# Patient Record
Sex: MALE | Race: WHITE | NOT HISPANIC OR LATINO | Employment: FULL TIME | URBAN - METROPOLITAN AREA
[De-identification: names, ages, dates, MRNs, and addresses within clinical notes are randomized per-mention and may not be internally consistent; named-entity substitution may affect disease eponyms.]

---

## 2021-04-29 ENCOUNTER — HOSPITAL ENCOUNTER (EMERGENCY)
Facility: HOSPITAL | Age: 26
Discharge: HOME | End: 2021-04-29
Admitting: EMERGENCY MEDICINE
Payer: COMMERCIAL

## 2021-04-29 VITALS
HEART RATE: 67 BPM | OXYGEN SATURATION: 98 % | RESPIRATION RATE: 16 BRPM | DIASTOLIC BLOOD PRESSURE: 86 MMHG | WEIGHT: 142.2 LBS | SYSTOLIC BLOOD PRESSURE: 142 MMHG | TEMPERATURE: 97.7 F

## 2021-04-29 DIAGNOSIS — J06.9 URI (UPPER RESPIRATORY INFECTION): Primary | ICD-10-CM

## 2021-04-29 LAB — SARS-COV-2 RNA RESP QL NAA+PROBE: NEGATIVE

## 2021-04-29 PROCEDURE — U0003 INFECTIOUS AGENT DETECTION BY NUCLEIC ACID (DNA OR RNA); SEVERE ACUTE RESPIRATORY SYNDROME CORONAVIRUS 2 (SARS-COV-2) (CORONAVIRUS DISEASE [COVID-19]), AMPLIFIED PROBE TECHNIQUE, MAKING USE OF HIGH THROUGHPUT TECHNOLOGIES AS DESCRIBED BY CMS-2020-01-R: HCPCS | Performed by: PHYSICIAN ASSISTANT

## 2021-04-29 PROCEDURE — U0005 INFEC AGEN DETEC AMPLI PROBE: HCPCS | Performed by: PHYSICIAN ASSISTANT

## 2021-04-29 PROCEDURE — 99283 EMERGENCY DEPT VISIT LOW MDM: CPT

## 2021-04-29 PROCEDURE — 99282 EMERGENCY DEPT VISIT SF MDM: CPT | Performed by: PHYSICIAN ASSISTANT

## 2021-04-29 NOTE — ED PROVIDER NOTES
History  Chief Complaint   Patient presents with    Sore Throat     pt reports sore throat and congestion x 2 days  pt denies fevers, denies n/v/d  Neli Cheng is a 31yo who presents to the ED for covid testing for work  Had a sore throat 2 days ago that has resolved  Now has a mild congestion and mild dry cough  No sick contacts  No known exposure  Just wants to get tested  Denies fevers, chills, sob, chest pain, abd pain, n/v/d, and rash; no sig  PMH/PSH  No other acute complaints  None       No past medical history on file  No past surgical history on file  No family history on file  I have reviewed and agree with the history as documented  E-Cigarette/Vaping     E-Cigarette/Vaping Substances     Social History     Tobacco Use    Smoking status: Never Smoker    Smokeless tobacco: Never Used   Substance Use Topics    Alcohol use: Never     Frequency: Never    Drug use: Never       Review of Systems   Constitutional: Negative for activity change, appetite change, chills, fatigue and fever  HENT: Positive for congestion  Negative for rhinorrhea and sore throat  Sore throat resolved   Eyes: Negative for visual disturbance  Respiratory: Positive for cough  Negative for shortness of breath  Cardiovascular: Negative for chest pain  Gastrointestinal: Negative for abdominal pain, diarrhea, nausea and vomiting  Genitourinary: Negative for dysuria and frequency  Skin: Negative for rash  Allergic/Immunologic: Negative for immunocompromised state  Neurological: Negative for dizziness, syncope, weakness and headaches  Psychiatric/Behavioral: Negative for behavioral problems and self-injury  All other systems reviewed and are negative  Physical Exam  Physical Exam  Vitals signs and nursing note reviewed  Constitutional:       General: He is not in acute distress  Appearance: He is well-developed  He is not ill-appearing, toxic-appearing or diaphoretic     HENT: Head: Normocephalic and atraumatic  Eyes:      Conjunctiva/sclera: Conjunctivae normal    Neck:      Musculoskeletal: Normal range of motion and neck supple  Cardiovascular:      Rate and Rhythm: Normal rate and regular rhythm  Pulmonary:      Effort: Pulmonary effort is normal       Breath sounds: Normal breath sounds  Skin:     General: Skin is warm and dry  Neurological:      Mental Status: He is alert and oriented to person, place, and time  Psychiatric:         Behavior: Behavior normal          Vital Signs  ED Triage Vitals [04/29/21 1143]   Temperature Pulse Respirations Blood Pressure SpO2   97 7 °F (36 5 °C) 67 16 142/86 98 %      Temp Source Heart Rate Source Patient Position - Orthostatic VS BP Location FiO2 (%)   Oral Monitor -- -- --      Pain Score       --           Vitals:    04/29/21 1143   BP: 142/86   Pulse: 67         Visual Acuity      ED Medications  Medications - No data to display    Diagnostic Studies  Results Reviewed     Procedure Component Value Units Date/Time    Novel Coronavirus (Covid-19),PCR [facility] - 2 Hour Stat [[id number]]  (Normal) Collected: 04/29/21 1219    Lab Status: Final result Specimen: Nasopharyngeal Swab Updated: 04/29/21 1336     SARS-CoV-2 Negative    Narrative: The specimen collection materials, transport medium, and/or testing methodology utilized in the production of these test results have been proven to be reliable in a limited validation with an abbreviated program under the Emergency Utilization Authorization provided by the FDA  Testing reported as "Presumptive positive" will be confirmed with secondary testing to ensure result accuracy  Clinical caution and judgement should be used with the interpretation of these results with consideration of the clinical impression and other laboratory testing  Testing reported as "Positive" or "Negative" has been proven to be accurate according to standard laboratory validation requirements    All testing is performed with control materials showing appropriate reactivity at standard intervals  No orders to display              Procedures  Procedures         ED Course                                           MDM    Disposition  Final diagnoses:   URI (upper respiratory infection)     Time reflects when diagnosis was documented in both MDM as applicable and the Disposition within this note     Time User Action Codes Description Comment    4/29/2021 12:14 PM Mars Fast Add [J06 9] URI (upper respiratory infection)       ED Disposition     ED Disposition Condition Date/Time Comment    Discharge Stable Thu Apr 29, 2021 12:14 PM Solitario Rodríguez discharge to home/self care  Follow-up Information     Follow up With Specialties Details Why Contact Info Additional [address] Emergency Department Emergency Medicine  If symptoms worsen [address]  [address] Emergency Department, [address], [place], [place], [address] [phone]          There are no discharge medications for this patient  No discharge procedures on file      PDMP Review     None          ED Provider  Electronically Signed by           Jonathan Cardenas PA-C  04/29/21 1477

## 2021-06-25 ENCOUNTER — HOSPITAL ENCOUNTER (EMERGENCY)
Facility: HOSPITAL | Age: 26
Discharge: HOME | End: 2021-06-25
Attending: EMERGENCY MEDICINE
Payer: COMMERCIAL

## 2021-06-25 VITALS
SYSTOLIC BLOOD PRESSURE: 129 MMHG | WEIGHT: 148.81 LBS | BODY MASS INDEX: 23.36 KG/M2 | DIASTOLIC BLOOD PRESSURE: 72 MMHG | TEMPERATURE: 98 F | RESPIRATION RATE: 16 BRPM | HEART RATE: 65 BPM | HEIGHT: 67 IN | OXYGEN SATURATION: 97 %

## 2021-06-25 DIAGNOSIS — F41.9 ANXIETY: Primary | ICD-10-CM

## 2021-06-25 PROCEDURE — 99283 EMERGENCY DEPT VISIT LOW MDM: CPT

## 2021-06-25 PROCEDURE — 99284 EMERGENCY DEPT VISIT MOD MDM: CPT | Performed by: EMERGENCY MEDICINE

## 2021-06-25 NOTE — Clinical Note
Priyank Babcock was seen and treated in our emergency department on 6/25/2021  Diagnosis:     Solitario    He may return on this date: If you have any questions or concerns, please don't hesitate to call        Nicho Posada MD    ______________________________           _______________          _______________  Hospital Representative                              Date                                Time

## 2021-06-26 NOTE — ED PROVIDER NOTES
History  Chief Complaint   Patient presents with    Numbness     pt reports b/l hand numbness and tingling since this afternoon, pt states he also feels like his face is hot, pt states this has happened to him before and notices it happens when he gets stressed out or angry      59-year-old male with history of anxiety in the past presenting for evaluation increased anxiety and bilateral hand numbness and tingling in his fingertips has started this afternoon with increased stress  Patient reports that he thinks he may a panic attacks  Given to the emergency department today because he wants to be evaluated to see if his blood pressure is high or his heart rate is high  He is convinced that this is all due to panic attacks however  He denies any associated chest pain or shortness of breath with it  Denies any nausea or vomiting  Has not taken anything at home for the symptoms  Currently does not have any tingling or numbness in his fingertips  No increased recent life stressors  He reports that he has been using Sabianism recently to deal with his anxiety  He is not specifically interested in any psych resources at this time  Denies any significant depression, HI or SI  Denies any hallucinations or other psychiatric symptoms  Denies any drug use, does drink socially  No other medical complaints right now  History provided by:  Patient   used: No        None       History reviewed  No pertinent past medical history  History reviewed  No pertinent surgical history  History reviewed  No pertinent family history  I have reviewed and agree with the history as documented      E-Cigarette/Vaping     E-Cigarette/Vaping Substances     Social History     Tobacco Use    Smoking status: Never Smoker    Smokeless tobacco: Never Used   Substance Use Topics    Alcohol use: Yes     Comment: socially     Drug use: Never        Review of Systems   Constitutional: Negative for chills and fever    HENT: Negative for congestion and sore throat  Eyes: Negative for pain and visual disturbance  Respiratory: Negative for cough, shortness of breath and wheezing  Cardiovascular: Negative for chest pain and palpitations  Gastrointestinal: Negative for abdominal pain, diarrhea, nausea and vomiting  Genitourinary: Negative for dysuria and hematuria  Musculoskeletal: Negative for arthralgias and back pain  Skin: Negative for color change and rash  Neurological: Negative for seizures and syncope  Psychiatric/Behavioral: Negative for agitation, confusion, dysphoric mood and suicidal ideas  The patient is nervous/anxious  All other systems reviewed and are negative  Physical Exam  ED Triage Vitals [06/25/21 2146]   Temperature Pulse Respirations Blood Pressure SpO2   98 °F (36 7 °C) 65 16 129/72 97 %      Temp Source Heart Rate Source Patient Position - Orthostatic VS BP Location FiO2 (%)   Oral Monitor Sitting Right arm --      Pain Score       --             Orthostatic Vital Signs  Vitals:    06/25/21 2146   BP: 129/72   Pulse: 65   Patient Position - Orthostatic VS: Sitting       Physical Exam  Vitals and nursing note reviewed  Constitutional:       Appearance: Normal appearance  He is well-developed  He is not ill-appearing or diaphoretic  HENT:      Head: Normocephalic and atraumatic  Right Ear: External ear normal       Left Ear: External ear normal       Nose: Nose normal       Mouth/Throat:      Mouth: Mucous membranes are moist       Pharynx: Oropharynx is clear  Eyes:      Conjunctiva/sclera: Conjunctivae normal    Cardiovascular:      Rate and Rhythm: Normal rate and regular rhythm  Heart sounds: No murmur heard  Pulmonary:      Effort: Pulmonary effort is normal  No respiratory distress  Breath sounds: Normal breath sounds  No wheezing  Abdominal:      General: Abdomen is flat  Palpations: Abdomen is soft  Tenderness:  There is no abdominal tenderness  Musculoskeletal:         General: Normal range of motion  Cervical back: Neck supple  Skin:     General: Skin is warm and dry  Neurological:      General: No focal deficit present  Mental Status: He is alert  Psychiatric:         Mood and Affect: Mood normal          Thought Content: Thought content normal       Comments: Calm, not obviously anxious  Not responding to internal stimuli  ED Medications  Medications - No data to display    Diagnostic Studies  Results Reviewed     None                 No orders to display         Procedures  Procedures      ED Course                             SBIRT 20yo+      Most Recent Value   SBIRT (22 yo +)   In order to provide better care to our patients, we are screening all of our patients for alcohol and drug use  Would it be okay to ask you these screening questions? No Filed at: 06/25/2021 2147                [facility]  Number of Diagnoses or Management Options  Anxiety  Diagnosis management comments: 51-year-old male presenting for evaluation of anxiety at home  Patient is here because he want to make sure his vital signs were within normal limits  Patient is normotensive, not tachycardic  Well-appearing on room air  In no acute distress  Reassured that his VS are normal today  Given psychiatric information in the area and discussed that if his symptoms get worse he should return to the ER  Discharged  Disposition  Final diagnoses:   Anxiety     Time reflects when diagnosis was documented in both MDM as applicable and the Disposition within this note     Time User Action Codes Description Comment    6/25/2021 10:18 PM Katherine Bolton Add [F41 9] Anxiety       ED Disposition     ED Disposition Condition Date/Time Comment    Discharge Good Fri Jun 25, 2021 10:18  [facility] discharge to home/self care              Follow-up Information     Follow up With Specialties Details Why Contact Info Additional Information    IAM Knapp  Schedule an appointment as soon as possible for a visit in 2 days For reevaluation as we discussed  [address] [facility] [address]   [phone]       [address] [place] [address] Emergency Department Emergency Medicine Go to  As needed [address]  958 [facility] [address] [facility] Emergency Department, [address], [address] [phone]          There are no discharge medications for this patient  No discharge procedures on file  PDMP Review     None           ED Provider  Attending physically available and evaluated Kamala Eldridge I managed the patient along with the ED Attending      Electronically Signed by         Angela Cornejo MD  06/26/21 8585

## 2021-06-26 NOTE — ED ATTENDING ATTESTATION
6/25/2021  I saw and evaluated the patient  I have discussed the patient with the resident physician and agree with the resident's findings, assessment and plan as documented in the resident physician's note, unless otherwise documented below  All available laboratory and imaging studies were reviewed by myself  I was present for key portions of any procedure(s) performed by the resident and I was immediately available to provide assistance  I agree with the current assessment done in the Emergency Department  I have conducted an independent evaluation of this patient  Chief Complaint   Patient presents with    Numbness     pt reports b/l hand numbness and tingling since this afternoon, pt states he also feels like his face is hot, pt states this has happened to him before and notices it happens when he gets stressed out or angry         Lawrence Orta is a 32 y o  male with no significant past medical history presenting with episodes of anxiety and panic attacks  Patient does not carry a formal psychiatric diagnosis  He reports that in the past, he has had episodes of anxiety and would experience numbness and tingling in his hands and fingertips  Something similar happened today when he was under stress and developed a sensation of his face flushing, bilateral hand and finger tip numbness and tingling  He is feeling improved but wanted to check in to make sure that his blood pressure and his heart rate were not elevated  He has not had any shortness of breath or chest pain  Symptoms today are similar to when his previously had anxiety  Denies SI or HI  Reports that mood is okay  Declines offer for outpatient resources and reports that he has been praying and relying on God to help him cope with anxiety  He does find it useful  Denies substance use  Reports drinking socially      Review of Systems  Constitutional: denies fevers, chills  Visual/Eyes: no changes in vision  HENT: no rhinorrhea, no sore throat  Cardiac: no chest pain  Respiratory: no shortness of breath, no cough  GI: no abdominal pain, nausea, vomiting, or diarrhea  : no dysuria  Heme/Onc: no easy bruising  Neuro:  Numbness and tingling in bilateral hands whenever anxious, none at present, no headache    Ten systems reviewed and negative unless otherwise noted in HPI and above    Physical Exam  Vitals:    06/25/21 2146   BP: 129/72   TempSrc: Oral   Pulse: 65   Resp: 16   Patient Position - Orthostatic VS: Sitting   Temp: 98 °F (36 7 °C)     SPO2 RA Rest      ED from 6/25/2021 in [address] [place] [address] Emergency Department   SpO2  97 %   SpO2 Activity  At Rest   O2 Device  None (Room air)   O2 Flow Rate  --         Constitutional:  Awake, alert, oriented  No acute distress  HEENT:  Normocephalic, atraumatic  Sclera anicteric, conjunctiva not injected  Moist oral mucosa  Cardiac:  Appears well-perfused  Respiratory:  Breathing comfortably on room air  Abdomen:  Nondistended  Extremities:  No deformities, no edema  Integument:  No rashes over exposed areas, cap refill less than 2 seconds  Neurologic:  Awake, alert, and oriented x3  Nonfocal exam   Psychiatric:  Patient is well groomed  Normal affect, pleasant, reports issues with anxiety  Reports mood is good  Denies SI, HI  Does not appear to attend to internal stimuli  No evidence of audiovisual hallucinations  ED Course  Medications - No data to display  51-year-old male presenting with episodes of anxiety, presenting today to ensure there are no other abnormalities such as elevated blood pressure or heart rate  At present, vital signs are within normal limits  Patient is in no acute distress  In discussion with the patient, patient reports that he is trying to cope with his anxiety with assistance of introspection and praying  He declines consultation with the ED crisis worker  He denies SI, HI    Patient provided with mental health care resources in the area and encouraged to seek medical attention right away if his symptoms are worsening, or if he develops chest pain, shortness of breath, or new or worsening symptoms      Clinical Impression  Final diagnoses:   Anxiety

## 2021-12-26 ENCOUNTER — HOSPITAL ENCOUNTER (EMERGENCY)
Facility: HOSPITAL | Age: 26
Discharge: HOME | End: 2021-12-26
Attending: EMERGENCY MEDICINE
Payer: COMMERCIAL

## 2021-12-26 ENCOUNTER — APPOINTMENT (EMERGENCY)
Dept: RADIOLOGY | Facility: HOSPITAL | Age: 26
End: 2021-12-26
Payer: COMMERCIAL

## 2021-12-26 VITALS
HEART RATE: 87 BPM | SYSTOLIC BLOOD PRESSURE: 128 MMHG | WEIGHT: 150 LBS | OXYGEN SATURATION: 96 % | BODY MASS INDEX: 23.54 KG/M2 | DIASTOLIC BLOOD PRESSURE: 70 MMHG | TEMPERATURE: 98 F | HEIGHT: 67 IN | RESPIRATION RATE: 16 BRPM

## 2021-12-26 DIAGNOSIS — F10.929 ALCOHOL INTOXICATION (HCC): Primary | ICD-10-CM

## 2021-12-26 DIAGNOSIS — Z91.89: ICD-10-CM

## 2021-12-26 LAB
ALBUMIN SERPL BCP-MCNC: 4.8 G/DL (ref 3.5–5)
ALP SERPL-CCNC: 66 U/L (ref 46–116)
ALT SERPL W P-5'-P-CCNC: 24 U/L (ref 12–78)
ANION GAP SERPL CALCULATED.3IONS-SCNC: 9 MMOL/L (ref 4–13)
APAP SERPL-MCNC: <2 UG/ML (ref 10–20)
AST SERPL W P-5'-P-CCNC: 17 U/L (ref 5–45)
BASOPHILS # BLD AUTO: 0.08 THOUSANDS/ΜL (ref 0–0.1)
BASOPHILS NFR BLD AUTO: 1 % (ref 0–1)
BILIRUB SERPL-MCNC: 0.53 MG/DL (ref 0.2–1)
BILIRUB UR QL STRIP: NEGATIVE
BILIRUB UR QL STRIP: NEGATIVE
BUN SERPL-MCNC: 11 MG/DL (ref 5–25)
CALCIUM SERPL-MCNC: 8.7 MG/DL (ref 8.3–10.1)
CHLORIDE SERPL-SCNC: 109 MMOL/L (ref 100–108)
CLARITY UR: CLEAR
CLARITY UR: CLEAR
CO2 SERPL-SCNC: 24 MMOL/L (ref 21–32)
COLOR UR: YELLOW
COLOR UR: YELLOW
CREAT SERPL-MCNC: 0.96 MG/DL (ref 0.6–1.3)
EOSINOPHIL # BLD AUTO: 0.06 THOUSAND/ΜL (ref 0–0.61)
EOSINOPHIL NFR BLD AUTO: 1 % (ref 0–6)
ERYTHROCYTE [DISTWIDTH] IN BLOOD BY AUTOMATED COUNT: 12.1 % (ref 11.6–15.1)
ETHANOL SERPL-MCNC: 256 MG/DL (ref 0–3)
GFR SERPL CREATININE-BSD FRML MDRD: 108 ML/MIN/1.73SQ M
GLUCOSE SERPL-MCNC: 79 MG/DL (ref 65–140)
GLUCOSE UR STRIP-MCNC: NEGATIVE MG/DL
GLUCOSE UR STRIP-MCNC: NEGATIVE MG/DL
HCT VFR BLD AUTO: 48.5 % (ref 36.5–49.3)
HGB BLD-MCNC: 16.6 G/DL (ref 12–17)
HGB UR QL STRIP.AUTO: NEGATIVE
HGB UR QL STRIP.AUTO: NEGATIVE
IMM GRANULOCYTES # BLD AUTO: 0.02 THOUSAND/UL (ref 0–0.2)
IMM GRANULOCYTES NFR BLD AUTO: 0 % (ref 0–2)
KETONES UR STRIP-MCNC: NEGATIVE MG/DL
KETONES UR STRIP-MCNC: NEGATIVE MG/DL
LEUKOCYTE ESTERASE UR QL STRIP: NEGATIVE
LEUKOCYTE ESTERASE UR QL STRIP: NEGATIVE
LYMPHOCYTES # BLD AUTO: 2.77 THOUSANDS/ΜL (ref 0.6–4.47)
LYMPHOCYTES NFR BLD AUTO: 37 % (ref 14–44)
MCH RBC QN AUTO: 31.2 PG (ref 26.8–34.3)
MCHC RBC AUTO-ENTMCNC: 34.2 G/DL (ref 31.4–37.4)
MCV RBC AUTO: 91 FL (ref 82–98)
MONOCYTES # BLD AUTO: 0.62 THOUSAND/ΜL (ref 0.17–1.22)
MONOCYTES NFR BLD AUTO: 8 % (ref 4–12)
NEUTROPHILS # BLD AUTO: 3.99 THOUSANDS/ΜL (ref 1.85–7.62)
NEUTS SEG NFR BLD AUTO: 53 % (ref 43–75)
NITRITE UR QL STRIP: NEGATIVE
NITRITE UR QL STRIP: NEGATIVE
NRBC BLD AUTO-RTO: 0 /100 WBCS
PH UR STRIP.AUTO: 7 [PH] (ref 4.5–8)
PH UR STRIP.AUTO: 7 [PH] (ref 4.5–8)
PLATELET # BLD AUTO: 386 THOUSANDS/UL (ref 149–390)
PMV BLD AUTO: 10 FL (ref 8.9–12.7)
POTASSIUM SERPL-SCNC: 3.8 MMOL/L (ref 3.5–5.3)
PROT SERPL-MCNC: 8.1 G/DL (ref 6.4–8.2)
PROT UR STRIP-MCNC: NEGATIVE MG/DL
PROT UR STRIP-MCNC: NEGATIVE MG/DL
RBC # BLD AUTO: 5.32 MILLION/UL (ref 3.88–5.62)
SALICYLATES SERPL-MCNC: <3 MG/DL (ref 3–20)
SODIUM SERPL-SCNC: 142 MMOL/L (ref 136–145)
SP GR UR STRIP.AUTO: 1.02 (ref 1–1.03)
SP GR UR STRIP.AUTO: 1.02 (ref 1–1.03)
UROBILINOGEN UR QL STRIP.AUTO: 0.2 E.U./DL
UROBILINOGEN UR QL STRIP.AUTO: 0.2 E.U./DL
WBC # BLD AUTO: 7.54 THOUSAND/UL (ref 4.31–10.16)

## 2021-12-26 PROCEDURE — 99284 EMERGENCY DEPT VISIT MOD MDM: CPT

## 2021-12-26 PROCEDURE — 85025 COMPLETE CBC W/AUTO DIFF WBC: CPT | Performed by: EMERGENCY MEDICINE

## 2021-12-26 PROCEDURE — 81003 URINALYSIS AUTO W/O SCOPE: CPT

## 2021-12-26 PROCEDURE — 70450 CT HEAD/BRAIN W/O DYE: CPT

## 2021-12-26 PROCEDURE — 36415 COLL VENOUS BLD VENIPUNCTURE: CPT | Performed by: EMERGENCY MEDICINE

## 2021-12-26 PROCEDURE — 82077 ASSAY SPEC XCP UR&BREATH IA: CPT | Performed by: EMERGENCY MEDICINE

## 2021-12-26 PROCEDURE — G1004 CDSM NDSC: HCPCS

## 2021-12-26 PROCEDURE — 74177 CT ABD & PELVIS W/CONTRAST: CPT

## 2021-12-26 PROCEDURE — 80053 COMPREHEN METABOLIC PANEL: CPT | Performed by: EMERGENCY MEDICINE

## 2021-12-26 PROCEDURE — 99284 EMERGENCY DEPT VISIT MOD MDM: CPT | Performed by: EMERGENCY MEDICINE

## 2021-12-26 PROCEDURE — 80179 DRUG ASSAY SALICYLATE: CPT | Performed by: EMERGENCY MEDICINE

## 2021-12-26 PROCEDURE — 80143 DRUG ASSAY ACETAMINOPHEN: CPT | Performed by: EMERGENCY MEDICINE

## 2021-12-26 PROCEDURE — 72125 CT NECK SPINE W/O DYE: CPT

## 2021-12-26 PROCEDURE — 71260 CT THORAX DX C+: CPT

## 2021-12-26 RX ADMIN — IOHEXOL 100 ML: 350 INJECTION, SOLUTION INTRAVENOUS at 05:12

## 2021-12-26 NOTE — ED NOTES
Patient provided with scrubs and socks for discharge home- clothing cut and covered in cement     Sara Cordova RN  12/26/21 1567

## 2021-12-26 NOTE — ED ATTENDING ATTESTATION
12/26/2021  IAlice MD, saw and evaluated the patient  I have discussed the patient with the resident/non-physician practitioner and agree with the resident's/non-physician practitioner's findings, Plan of Care, and MDM as documented in the resident's/non-physician practitioner's note, except where noted  All available labs and Radiology studies were reviewed  I was present for key portions of any procedure(s) performed by the resident/non-physician practitioner and I was immediately available to provide assistance  At this point I agree with the current assessment done in the Emergency Department  I have conducted an independent evaluation of this patient a history and physical is as follows:    ED Course     Emergency Department Note- Charmaine Banegas 32 y o  male MRN: [id number]    Unit/Bed#: ED 29 Encounter: [id number]    Charmaine Banegas is a 32 y o  male who presents with   Chief Complaint   Patient presents with    Alcohol Intoxication     PT found by a creek; intoxicated, wet, and covered in cemet  Pt poor historian at this time  History of Present Illness   HPI:  Charmaine Banegas is a 32 y o  male who presents for evaluation of: Intoxication  Patient was found with acute encephalopathy by a creek, covered with dried cement dust   Patient was drinking alcohol earlier in the evening  Patient is unable to provide further history secondary to acute intoxication  He is also unable to provide review of systems  Review of Systems   Unable to perform ROS: Mental status change (Secondary to acute intoxication )       Historical Information   No past medical history on file  No past surgical history on file    Social History   Social History     Substance and Sexual Activity   Alcohol Use Yes    Comment: socially      Social History     Substance and Sexual Activity   Drug Use Yes    Types: Marijuana     Social History     Tobacco Use   Smoking Status Never Smoker   Smokeless Tobacco Never Used     Family History: No family history on file  Meds/Allergies   PTA meds:   None     No Known Allergies    Objective   First Vitals:   Blood Pressure: (!) 179/87 (12/26/21 0122)  Pulse: 73 (12/26/21 0122)  Temperature: (!) 95 8 °F (35 4 °C) (12/26/21 0122)  Temp Source: Rectal (12/26/21 0122)  Respirations: 22 (12/26/21 0122)  Height: 5' 7" (170 2 cm) (12/26/21 0122)  Weight - Scale: 68 kg (150 lb) (12/26/21 0122)  SpO2: 97 % (12/26/21 0122)    Current Vitals:   Blood Pressure: 99/58 (12/26/21 0215)  Pulse: 58 (12/26/21 0215)  Temperature: (!) 95 8 °F (35 4 °C) (12/26/21 0122)  Temp Source: Rectal (12/26/21 0122)  Respirations: 18 (12/26/21 0215)  Height: 5' 7" (170 2 cm) (12/26/21 0122)  Weight - Scale: 68 kg (150 lb) (12/26/21 0122)  SpO2: 95 % (12/26/21 0215)    No intake or output data in the 24 hours ending 12/26/21 0345    Invasive Devices  Report    Peripheral Intravenous Line            Peripheral IV 12/26/21 Right Arm <1 day                Physical Exam  Vitals and nursing note reviewed  Constitutional:       General: He is not in acute distress  Appearance: He is well-developed  HENT:      Head: Normocephalic and atraumatic  Right Ear: External ear normal       Left Ear: External ear normal       Nose: Nose normal       Mouth/Throat:      Pharynx: No oropharyngeal exudate  Eyes:      Conjunctiva/sclera: Conjunctivae normal       Pupils: Pupils are equal, round, and reactive to light  Cardiovascular:      Rate and Rhythm: Normal rate and regular rhythm  Pulmonary:      Effort: Pulmonary effort is normal  No respiratory distress  Abdominal:      General: Abdomen is flat  There is no distension  Musculoskeletal:         General: No deformity  Normal range of motion  Cervical back: Normal range of motion and neck supple  Skin:     General: Skin is warm and dry  Capillary Refill: Capillary refill takes less than 2 seconds  Neurological:      General: No focal deficit present  Mental Status: He is disoriented  GCS: GCS eye subscore is 4  GCS verbal subscore is 4  GCS motor subscore is 6  Coordination: Coordination normal    Psychiatric:      Comments: Decision making capacity, thought content, and judgment are impaired secondary to intoxication           Medical Decision Makin  Acute encephalopathy secondary to intoxication:  CT scan head  2  Borderline acute hypothermia: Bare Hugger has been place to warm the patient  No results found for this or any previous visit (from the past 36 hour(s))  CT chest abdomen pelvis w contrast    (Results Pending)   CT head without contrast    (Results Pending)   CT spine cervical without contrast    (Results Pending)         Portions of the record may have been created with voice recognition software  Occasional wrong word or "sound a like" substitutions may have occurred due to the inherent limitations of voice recognition software  Read the chart carefully and recognize, using context, where substitutions have occurred          Critical Care Time  Procedures

## 2021-12-26 NOTE — ED PROVIDER NOTES
History  Chief Complaint   Patient presents with    Alcohol Intoxication     PT found by a creek; intoxicated, wet, and covered in cemet  Pt poor historian at this time  32 M who presents for AMS  Patient unable to provide hx  Patient reportedly was intoxicated, suspected to have walked through cement fields, and then went into river  He was found by a creek  Here is it intoxicated, covered in cement, wet, hypothermic  Immediately placed on bear hugger  None       No past medical history on file  No past surgical history on file  No family history on file  I have reviewed and agree with the history as documented  E-Cigarette/Vaping     E-Cigarette/Vaping Substances     Social History     Tobacco Use    Smoking status: Never Smoker    Smokeless tobacco: Never Used   Substance Use Topics    Alcohol use: Yes     Comment: socially     Drug use: Yes     Types: Marijuana        Review of Systems   Unable to perform ROS: Mental status change       Physical Exam  ED Triage Vitals   Temperature Pulse Respirations Blood Pressure SpO2   12/26/21 0122 12/26/21 0122 12/26/21 0122 12/26/21 0122 12/26/21 0122   (!) 95 8 °F (35 4 °C) 73 22 (!) 179/87 97 %      Temp Source Heart Rate Source Patient Position - Orthostatic VS BP Location FiO2 (%)   12/26/21 0122 12/26/21 0122 12/26/21 0122 12/26/21 0122 --   Rectal Monitor Lying Left arm       Pain Score       12/26/21 0215       No Pain             Orthostatic Vital Signs  Vitals:    12/26/21 0215 12/26/21 0330 12/26/21 0445 12/26/21 0658   BP: 99/58 104/55 96/53 128/70   Pulse: 58 70 64 87   Patient Position - Orthostatic VS:    Lying       Physical Exam  Constitutional:       Appearance: He is well-developed  Comments: Patient appears intoxicated  HENT:      Head: Normocephalic and atraumatic  Comments: No evidence of head injury       Right Ear: Tympanic membrane normal       Left Ear: Tympanic membrane normal       Nose: Nose normal  Mouth/Throat:      Mouth: Mucous membranes are moist    Eyes:      Pupils: Pupils are equal, round, and reactive to light  Comments: Both eyes appear injected  Cardiovascular:      Rate and Rhythm: Normal rate and regular rhythm  Heart sounds: Normal heart sounds  No murmur heard  Pulmonary:      Effort: Pulmonary effort is normal  No respiratory distress  Breath sounds: Normal breath sounds  Abdominal:      General: Bowel sounds are normal  There is no distension  Palpations: Abdomen is soft  Tenderness: There is no abdominal tenderness  Musculoskeletal:         General: No deformity  Skin:     General: Skin is warm  Findings: Rash present  Comments: Clothes removed  Scattered abrasions visualized on the lower extremities  Light cement powder in some areas  Neurological:      Mental Status: He is alert  He is disoriented  Motor: No weakness  Comments: AAOx1 on my evaluation  Appears intoxicated  Can move all extremities with good strength  Psychiatric:      Comments: Appears intoxicated           ED Medications  Medications   iohexol (OMNIPAQUE) 350 MG/ML injection (SINGLE-DOSE) 100 mL (100 mL Intravenous Given 12/26/21 0512)       Diagnostic Studies  Results Reviewed     Procedure Component Value Units Date/Time    Urine Macroscopic, POC [[id number]] Collected: 12/26/21 1329    Lab Status: Final result Specimen: Urine Updated: 12/26/21 1331     Color, UA Yellow     Clarity, UA Clear     pH, UA 7 0     Leukocytes, UA Negative     Nitrite, UA Negative     Protein, UA Negative mg/dl      Glucose, UA Negative mg/dl      Ketones, UA Negative mg/dl      Urobilinogen, UA 0 2 E U /dl      Bilirubin, UA Negative     Blood, UA Negative     Specific Gravity, UA 1 025    Narrative:      [facility] RESULT    Urine Macroscopic, POC [[id number]] Collected: 12/26/21 1209    Lab Status: Final result Specimen: Urine Updated: 12/26/21 1211     Color, UA Yellow     Clarity, UA Clear     pH, UA 7 0     Leukocytes, UA Negative     Nitrite, UA Negative     Protein, UA Negative mg/dl      Glucose, UA Negative mg/dl      Ketones, UA Negative mg/dl      Urobilinogen, UA 0 2 E U /dl      Bilirubin, UA Negative     Blood, UA Negative     Specific Gravity, UA 1 025    Narrative:      CLIN[facility]K RESULT    Comprehensive metabolic panel [[id number]]  (Abnormal) Collected: 12/26/21 0402    Lab Status: Final result Specimen: Blood from Arm, Right Updated: 12/26/21 0448     Sodium 142 mmol/L      Potassium 3 8 mmol/L      Chloride 109 mmol/L      CO2 24 mmol/L      ANION GAP 9 mmol/L      BUN 11 mg/dL      Creatinine 0 96 mg/dL      Glucose 79 mg/dL      Calcium 8 7 mg/dL      AST 17 U/L      ALT 24 U/L      Alkaline Phosphatase 66 U/L      Total Protein 8 1 g/dL      Albumin 4 8 g/dL      Total Bilirubin 0 53 mg/dL      eGFR 108 ml/min/1 73sq m     Narrative:      [facility] guidelines for Chronic Kidney Disease (CKD):     Stage 1 with normal or high GFR (GFR > 90 mL/min/1 73 square meters)    Stage 2 Mild CKD (GFR = 60-89 mL/min/1 73 square meters)    Stage 3A Moderate CKD (GFR = 45-59 mL/min/1 73 square meters)    Stage 3B Moderate CKD (GFR = 30-44 mL/min/1 73 square meters)    Stage 4 Severe CKD (GFR = 15-29 mL/min/1 73 square meters)    Stage 5 End Stage CKD (GFR <15 mL/min/1 73 square meters)  Note: GFR calculation is accurate only with a steady state creatinine    Salicylate level [[id number]]  (Abnormal) Collected: 12/26/21 0402    Lab Status: Final result Specimen: Blood from Arm, Right Updated: 08/40/17 7001     Salicylate Lvl <3 mg/dL     Acetaminophen level-If concentration is detectable, please discuss with medical  on call   [[id number]]  (Abnormal) Collected: 12/26/21 0402    Lab Status: Final result Specimen: Blood from Arm, Right Updated: 12/26/21 0448     Acetaminophen Level <2 ug/mL     Ethanol [[id number]]  (Abnormal) Collected: 12/26/21 0402    Lab Status: Final result Specimen: Blood from Arm, Right Updated: 12/26/21 0439     Ethanol Lvl 256 mg/dL     CBC and differential [[id number]] Collected: 12/26/21 0402    Lab Status: Final result Specimen: Blood from Arm, Right Updated: 12/26/21 0413     WBC 7 54 Thousand/uL      RBC 5 32 Million/uL      Hemoglobin 16 6 g/dL      Hematocrit 48 5 %      MCV 91 fL      MCH 31 2 pg      MCHC 34 2 g/dL      RDW 12 1 %      MPV 10 0 fL      Platelets 027 Thousands/uL      nRBC 0 /100 WBCs      Neutrophils Relative 53 %      Immat GRANS % 0 %      Lymphocytes Relative 37 %      Monocytes Relative 8 %      Eosinophils Relative 1 %      Basophils Relative 1 %      Neutrophils Absolute 3 99 Thousands/µL      Immature Grans Absolute 0 02 Thousand/uL      Lymphocytes Absolute 2 77 Thousands/µL      Monocytes Absolute 0 62 Thousand/µL      Eosinophils Absolute 0 06 Thousand/µL      Basophils Absolute 0 08 Thousands/µL                  CT chest abdomen pelvis w contrast   Final Result by Marko Sousa MD (12/26 0602)      No evidence of acute intrathoracic, intra-abdominal or intrapelvic parenchymal organ injury  No pneumothorax  The urinary bladder is distended, extending nearly to the level of the umbilicus  The wall of the distal esophagus appears somewhat thickened  Clinical correlation and follow-up is recommended  I personally discussed this study with Carolina Zapata on 12/26/2021 at 5:33 AM                Workstation performed: [id number]         CT head without contrast   Final Result by Marko Sousa MD (12/26 3760)      No acute intracranial abnormality  I personally discussed this study with Carolina Zapata on 12/26/2021 at 5:34 AM                      Workstation performed: [id number]         CT spine cervical without contrast   Final Result by Marko Sousa MD (12/26 5475)      No acute cervical spine fracture or traumatic malalignment               I personally discussed this study with Fadumo Henning on 12/26/2021 at 5:45 AM                       Workstation performed: [id number]               Procedures  Procedures      ED Course  ED Course as of 12/26/21 2039   Sun Dec 26, 2021   0452 MEDICAL ALCOHOL(!): 256                                       MDM  Number of Diagnoses or Management Options  Alcohol intoxication ([address] )  At risk for environmental injury  Diagnosis management comments: Given patient's unusual story, and inability to provide history, will perform basic labs, panel, and will pan scan at this time for trauma  If workup negative and patient has no additional complaints, will discharge when sober  Signed out to Dr Shook Aus  Disposition  Final diagnoses:   Alcohol intoxication ([address] )   At risk for environmental injury     Time reflects when diagnosis was documented in both MDM as applicable and the Disposition within this note     Time User Action Codes Description Comment    12/26/2021  7:37 AM Jigna Espinoza Add [F10 929] Alcohol intoxication ([address] )     12/26/2021  7:39 AM Jigna Espinoza Add [Z91 [id number]] At risk for environmental injury       ED Disposition     ED Disposition Condition Date/Time Comment    Discharge Stable [place] Dec 26, 2021 10:03  [place] discharge to home/self care  Follow-up Information     Follow up With Specialties Details Why Contact Info Additional Information    IAM Anglin  Call   [address] [facility] [address] [place], [place] [address]kirill [phone]       [address] [place] [address] Emergency Department Emergency Medicine  If symptoms worsen [address]  [address] [facility] Emergency Department, [address], [place], [place], [address] [phone]          There are no discharge medications for this patient  No discharge procedures on file      PDMP Review     None           ED Provider  Attending physically available and evaluated Joelle Palma Heidi Snyder I managed the patient along with the ED Attending      Electronically Signed by         Ameena Woods MD  12/26/21 2039